# Patient Record
Sex: MALE | Race: BLACK OR AFRICAN AMERICAN | NOT HISPANIC OR LATINO
[De-identification: names, ages, dates, MRNs, and addresses within clinical notes are randomized per-mention and may not be internally consistent; named-entity substitution may affect disease eponyms.]

---

## 2024-02-22 ENCOUNTER — APPOINTMENT (OUTPATIENT)
Dept: CARDIOLOGY | Facility: CLINIC | Age: 64
End: 2024-02-22
Payer: MEDICAID

## 2024-02-22 ENCOUNTER — NON-APPOINTMENT (OUTPATIENT)
Age: 64
End: 2024-02-22

## 2024-02-22 VITALS
RESPIRATION RATE: 14 BRPM | HEIGHT: 72 IN | SYSTOLIC BLOOD PRESSURE: 152 MMHG | HEART RATE: 78 BPM | OXYGEN SATURATION: 99 % | WEIGHT: 160 LBS | BODY MASS INDEX: 21.67 KG/M2 | DIASTOLIC BLOOD PRESSURE: 87 MMHG

## 2024-02-22 DIAGNOSIS — R01.1 CARDIAC MURMUR, UNSPECIFIED: ICD-10-CM

## 2024-02-22 DIAGNOSIS — I10 ESSENTIAL (PRIMARY) HYPERTENSION: ICD-10-CM

## 2024-02-22 DIAGNOSIS — Z13.6 ENCOUNTER FOR SCREENING FOR CARDIOVASCULAR DISORDERS: ICD-10-CM

## 2024-02-22 DIAGNOSIS — Z82.49 FAMILY HISTORY OF ISCHEMIC HEART DISEASE AND OTHER DISEASES OF THE CIRCULATORY SYSTEM: ICD-10-CM

## 2024-02-22 PROCEDURE — 99204 OFFICE O/P NEW MOD 45 MIN: CPT | Mod: 25

## 2024-02-22 PROCEDURE — 93000 ELECTROCARDIOGRAM COMPLETE: CPT

## 2024-02-23 PROBLEM — Z82.49 FAMILY HISTORY OF CORONARY ARTERY DISEASE: Status: ACTIVE | Noted: 2024-02-23

## 2024-02-23 RX ORDER — LISINOPRIL 20 MG/1
20 TABLET ORAL DAILY
Refills: 0 | Status: ACTIVE | COMMUNITY

## 2024-02-23 RX ORDER — HYDROCHLOROTHIAZIDE 12.5 MG/1
12.5 TABLET ORAL DAILY
Refills: 0 | Status: ACTIVE | COMMUNITY

## 2024-02-23 NOTE — HISTORY OF PRESENT ILLNESS
[FreeTextEntry1] : 64 yo male with hypertension who presents today for cardiac evaluation based on family history of CAD. He reports that his sister was diagnosed at age 61 with obstructive CAD and underwent PCI but subsequently  4 days later. Patient denies chest pain, dyspnea, palpitations, syncope, edema, melena, hematochezia, or hematemesis. He reports that his BP usually runs 110-130s per home readings.   Primary: Blayne Ortez ([facility], fax: [phone])

## 2024-02-23 NOTE — PHYSICAL EXAM
[Normal Rate] : normal [Rhythm Regular] : regular [Normal S1] : normal S1 [Normal S2] : normal S2 [II] : a grade 2 [No Pitting Edema] : no pitting edema present [Well Nourished] : well nourished [Well Developed] : well developed [No Acute Distress] : no acute distress [Normal Conjunctiva] : normal conjunctiva [No Carotid Bruit] : no carotid bruit [Normal Venous Pressure] : normal venous pressure [Clear Lung Fields] : clear lung fields [No Respiratory Distress] : no respiratory distress  [Normal Gait] : normal gait [No Cyanosis] : no cyanosis [No Edema] : no edema [No Clubbing] : no clubbing [No Focal Deficits] : no focal deficits [Moves all extremities] : moves all extremities [Normal Speech] : normal speech [Alert and Oriented] : alert and oriented [Normal memory] : normal memory [Right Carotid Bruit] : no bruit heard over the right carotid [Left Carotid Bruit] : no bruit heard over the left carotid

## 2024-02-23 NOTE — ASSESSMENT
[FreeTextEntry1] : 62 yo male with hypertension and family history of CAD (sister underwent PCI and  4 days later at age 61) ECG today demonstrated sinus rhythm, rate 75 bpm, RBBB, LVH, LAD.  Given cardiac murmur, abnormal ECG, and hypertension, will order echocardiogram to assess LV function and structural heart disease. Will order coronary calcium score given family history of CAD. After review of test results, will determine if further cardiac work-up or intervention is clinically indicated.  Will continue lisinopril 20 mg po daily and HCTZ 12.5 mg po daily for hypertension management at this time. Will request most recent labs from [facility] for my review.

## 2024-03-27 ENCOUNTER — APPOINTMENT (OUTPATIENT)
Dept: CARDIOLOGY | Facility: CLINIC | Age: 64
End: 2024-03-27
Payer: MEDICAID

## 2024-03-27 DIAGNOSIS — R94.31 ABNORMAL ELECTROCARDIOGRAM [ECG] [EKG]: ICD-10-CM

## 2024-03-27 PROCEDURE — 93306 TTE W/DOPPLER COMPLETE: CPT

## 2024-06-07 DIAGNOSIS — Z00.00 ENCOUNTER FOR GENERAL ADULT MEDICAL EXAMINATION W/OUT ABNORMAL FINDINGS: ICD-10-CM

## 2024-07-03 ENCOUNTER — APPOINTMENT (OUTPATIENT)
Dept: HEMATOLOGY ONCOLOGY | Facility: CLINIC | Age: 64
End: 2024-07-03